# Patient Record
Sex: FEMALE | Race: WHITE | NOT HISPANIC OR LATINO | Employment: FULL TIME | URBAN - METROPOLITAN AREA
[De-identification: names, ages, dates, MRNs, and addresses within clinical notes are randomized per-mention and may not be internally consistent; named-entity substitution may affect disease eponyms.]

---

## 2023-05-09 ENCOUNTER — TELEPHONE (OUTPATIENT)
Dept: PSYCHIATRY | Facility: CLINIC | Age: 28
End: 2023-05-09

## 2023-05-09 NOTE — TELEPHONE ENCOUNTER
Dorothy Redman  requested a call back to discuss Talk therapy  Pt is looking to be schedule near [facility]        They can be reached at P# [phone]       Thank you

## 2023-05-10 NOTE — TELEPHONE ENCOUNTER
"Behavioral Health Outpatient Intake Questions    Referred By: self     Please advise interviewee that they need to answer all questions truthfully to allow for best care, and any misrepresentations of information may affect their ability to be seen at this clinic   => Was this discussed? Yes     If Minor Child (under age 25)    Who is/are the legal guardian(s) of the child? Is there a custody agreement? No     • If \"YES\"- Custody orders must be obtained prior to scheduling the first appointment  • In addition, Consent to Treatment must be signed by all legal guardians prior to scheduling the first appointment    • If \"NO\"- Consent to Treatment must be signed by all legal guardians prior to scheduling the first appointment    [address] History -     Presenting Problem (in patient's own words): delve into childhood trauma that she might have, relationship issues, etc    Are there any communication barriers for this patient? No                                               If yes, please describe barriers: n/a  • If there is a unique situation, please refer to [facility] for final determination  Are you taking any psychiatric medications? No   •   If \"YES\" -What are they n/a   •   If \"YES\" -Who prescribes? Has the Patient previously received outpatient Talk Therapy or Medication Management from [facility]  No     •    If \"YES\"- When, Where and with Whom? n/a     •    If \"NO\" -Has Patient received these services elsewhere? •   If \"YES\" -When, Where, and with Whom? E[facility] via [facility], [facility]    Has the Patient abused alcohol or other substances in the last 6 months ? No  No concerns of substance abuse are reported  •  If \"YES\" -What substance, How much, How often?n/a    •  If illegal substance: Refer to [facility] (for GERALDO) or [email or web address]    •  If Alcohol in excess of 10 drinks per week:  Refer to [facility] [facility] (for GERALDO) or [facility] " "Offices    Legal History-     Is this treatment court ordered? No   If \"yes \"send to :  • Talk Therapy : Send to [facility] for final determination   • Med Management: Send to Dr Ligia Meeks for final determination     Has the Patient been convicted of a felony? No   If \"Yes\" send to -When, What?n/a  • Talk Therapy : Send to [facility] for final determination   • Med Management: Send to Dr Ligia Meeks for final determination     ACCEPTED as a patient Yes  • If \"Yes\" Appointment Date: 5/23 at [facility]? No  • If “Yes” - (Where? Ex: [facility], [facility], [address] [place] [address], etc ) n/a      Name of [address] [place] [address]#[phone]  Insurance Phone #[phone]  If ins is primary or secondary?primary  If patient is a minor, parents information such as Name, D  O B of guarantor    "

## 2023-05-23 ENCOUNTER — SOCIAL WORK (OUTPATIENT)
Dept: BEHAVIORAL/MENTAL HEALTH CLINIC | Facility: CLINIC | Age: 28
End: 2023-05-23

## 2023-05-23 DIAGNOSIS — F41.9 ANXIETY DISORDER, UNSPECIFIED TYPE: Primary | ICD-10-CM

## 2023-05-23 DIAGNOSIS — F32.A DEPRESSION, UNSPECIFIED DEPRESSION TYPE: ICD-10-CM
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
## 2023-06-06 ENCOUNTER — SOCIAL WORK (OUTPATIENT)
Dept: BEHAVIORAL/MENTAL HEALTH CLINIC | Facility: CLINIC | Age: 28
End: 2023-06-06
Payer: COMMERCIAL

## 2023-06-06 DIAGNOSIS — F41.9 ANXIETY DISORDER, UNSPECIFIED TYPE: Primary | ICD-10-CM

## 2023-06-06 DIAGNOSIS — F32.A DEPRESSION, UNSPECIFIED DEPRESSION TYPE: ICD-10-CM

## 2023-06-06 PROCEDURE — 90834 PSYTX W PT 45 MINUTES: CPT | Performed by: COUNSELOR

## 2023-06-06 NOTE — PSYCH
Behavioral Health Psychotherapy Progress Note    Psychotherapy Provided: Individual Psychotherapy     1  Anxiety disorder, unspecified type        2  Depression, unspecified depression type              DATA: Client's mood was neutral and affect was congruent  Session occurred in the office  Client conveyed no changes since the intake  During this session, this clinician used the following therapeutic modalities: CBT    Substance Abuse was not addressed during this session  If the client is diagnosed with a co-occurring substance use disorder, please indicate any changes in the frequency or amount of use: N/A  Stage of change for addressing substance use diagnoses: No substance use/Not applicable      ASSESSMENT: Reviewed the results of the screeners with Arelis and inquired of her perspective of said results  Provided brief psychoed on the impact of adverse childhood events on adult health outcomes  Mutually completed an initial crisis plan and an initial treatment plan  Conversed on goals pertaining to emotions and to self-confidence  Ashley Hong revealed she wants to improve communication of her emotions with an outcome of having conversations with her boyfriend about her unpleasant emotions  Arelis shared she desires to explore her self-confidence in the work environment with an outcome of developing confidence in her ability to provide feedback to her supervisees  Clinician provided expectations for therapy and for the therapeutic relationship  Mckay Carcamo presents with a none risk of suicide, none risk of self-harm, and none risk of harm to others  A safety plan was indicated: no      PLAN: Client will focus on newly constructed treatment objectives  Behavioral Health Treatment Plan and Discharge Planning: Mckay Carcamo is aware of and agrees to continue to work on their treatment plan  They have identified and are working toward their discharge goals   yes    Visit start and stop times:    06/06/23  Start Time: 2079  Stop Time: 1805  Total Visit Time: 46 minutes

## 2023-06-06 NOTE — BH TREATMENT PLAN
"[address] [facility]  1995     Date of Initial Psychotherapy Assessment: 5-23-23    Date of Current Treatment Plan: 06/06/23  Treatment Plan Target Date: 10-6-23  Treatment Plan Expiration Date: 10-6-23    Diagnosis:   1  Anxiety disorder, unspecified type        2  Depression, unspecified depression type            Symptoms: Client endorsed anhedonia, sleep disturbance, low self-esteem, concentration deficit, restlessness, excessive worry, and racing thoughts  Intervention: CBT and IPT to address anxiety and depression    Long Term Goal: Client reported \"to have a better understanding of how those childhood experiences got me to where I am  Being able to understand my emotions and thoughts in the moment to be able to effectively communicate them  \"     Short Term Goal: Improve communication of her emotions      Outcome Goal: Client will report having conversations with her boyfriend about her unpleasant emotions     Short Term Goal: Explore self-confidence in the work environment        Outcome Goal: Client will report developing confidence in her ability to provide feedback to her supervisees    Strengths: mother, sister, self-awareness, reading     Discharge: Client shared \"when I'm able to manage my relationship and can have the difficult conversations with my boyfriend  \"    Importance (1-10): 8       I am currently under the care of a [facility] psychiatric provider: no    My [facility] psychiatric provider is: N/A    I am currently taking psychiatric medications: No    For children and adults who have a legal guardian:   Has there been any change to custody orders and/or guardianship status? NA  If yes, attach updated documentation      I have created my Crisis Plan and have been offered a copy of this plan    [address]: Diagnosis and Treatment Plan explained to [facility] acknowledges an " understanding of their diagnosis  Shayy Pass agrees to this treatment plan  I have been offered a copy of this Treatment Plan   no

## 2023-06-06 NOTE — BH CRISIS PLAN
"Client Name: Laurita Samuel       Client YOB: 1995  : 1995    Treatment Team (include name and contact information):     Psychotherapist: Connie Marte    Psychiatrist: N/A   Release of information completed: N/A      Healthcare Provider  MERRY Louis P   [address]  [phone]      Type of Plan   * Plans for all individuals 15 yo and above must be signed by the client  Plan Type: adolescent/adult (14 and over) Initial      My Personal Strengths are (in the client's own words):    Client reported \"self-awareness, organizied, and prepared  \"    The stressors and triggers that may put me at risk are:   Client conveyed \"my boyfriend, my family, I struggle with wanting alone time and wanting to talk to people, and when it comes time for my period  \"     Coping skills I can use to keep myself calm and safe:   Client shared \"reading, talking with my mom and sister, my boyfriend, and starting to go to the gym  \"    Coping skills/supports I can use to maintain abstinence from substance use:    Client shared \"reading, talking with my mom and sister, my boyfriend, and starting to go to the gym  \"    The people that provide me with help and support: (Include name, contact, and how they can help)      Support person #1: Helena Torie    * Phone number: In client's phone    * How can they help me? Client shared \"I go to her a lot for fertility struggles because she can related to what I'm going through  \"       Support person #2:Tessa-sister    * Phone number: In client's phone    * How can they help me? Client conveyed \"I feel super comfortable around her and I can talk to her about anything  We can talk about our childhood experiences  \"       In the past, the following has helped me in times of crisis:    Client expressed \"I call my mom or my sister  \"       If it is an emergency and you need immediate help, call     If there is a possibility of danger to yourself or " others, call the following crisis hotline resources:     Adult Crisis Numbers  Suicide Prevention Hotline - Dial 9-8-8  [facility]: [address]: [address]: [phone]  [address] ([facility]): [phone]  [facility]: [address] [place] [address] Avenue: [address] [place] [address] St: [phone] (daytime)  [phone] (after hours, weekends, holidays)     Child/Adolescent Crisis Numbers   [facility]: [address]: [phone]   [facility]: [phone]   [address] [facility] ([facility]): [phone]    Please note: Some [facility] do not have a separate number for Child/Adolescent specific crisis  If your county is not listed under Child/Adolescent, please call the adult number for your county     National Talk to Text Line   All Ages - [phone]    In the event your feelings become unmanageable, and you cannot reach your support system, you will call 911 immediately or go to the nearest hospital emergency room

## 2023-06-22 ENCOUNTER — SOCIAL WORK (OUTPATIENT)
Dept: BEHAVIORAL/MENTAL HEALTH CLINIC | Facility: CLINIC | Age: 28
End: 2023-06-22
Payer: COMMERCIAL

## 2023-06-22 ENCOUNTER — DOCUMENTATION (OUTPATIENT)
Dept: BEHAVIORAL/MENTAL HEALTH CLINIC | Facility: CLINIC | Age: 28
End: 2023-06-22

## 2023-06-22 DIAGNOSIS — F41.9 ANXIETY DISORDER, UNSPECIFIED TYPE: Primary | ICD-10-CM

## 2023-06-22 DIAGNOSIS — F32.A DEPRESSION, UNSPECIFIED DEPRESSION TYPE: ICD-10-CM

## 2023-06-22 PROCEDURE — 90834 PSYTX W PT 45 MINUTES: CPT | Performed by: COUNSELOR

## 2023-06-22 NOTE — PROGRESS NOTES
"Psychotherapy Discharge Summary    Preferred Name: Cash Drummond  YOB: 1995    Admission Date to Psychotherapy: 23    Referred by: Self    Presenting Problem: The following remarks were made by Adrian Carvajal at the time of intake: Client reported \"I didn't really like my last counselor\" and she is transferring services from another agency  Client conveyed \"I want to dive into childhood trauma\" and she has been having difficulty with memory from her childhood  Arelis expressed she believes trauma is \"bleeding into my current relationship\" with how they communicate and how she understands her partner  Marty Pruitt revealed she has challenges understanding what she is feeling at times and why she is feeling it  Client noted her romantic relationship can be a barometer for her mood on some days  Client shared her vocation is sometimes stressful because she takes on a lot of responsibility at times without requesting it  Arelis disclosed communication with her boyfriend is a struggle point  Client added her partner is a \"recovering addict\" whose father  due to alcohol use  Client noted her partner will \"dabble\" in cannabis use  Arelis expressed \"I have a lot of sexual frustrations\" in regards to quality and quantity of their sex  Arelis shared she experienced an emotionally painful miscarriage last year  Client noted family stress related to certain members  Piedadgamal Pruitt reported uncertainty with the future because she and her partner are not in unison and there is a Leni lack of follow through  \" Client conveyed she flounders to talk about what she is grateful for in general  Client explained she frequently compartmentalizes and her perspective on various items can flip flop  Client noted she initially began to struggle with her mental health detention through college in 2016  Marty Pruitt noted her current episode has lasted two years   Arelis identified coping as \"a lot of ignoring\", taking baths, crying, quality time with her " sister and niece, and quality time with her mother  Course of Treatment: Client attended 2 sessions of individual therapy post intake  Treatment focused on building rapport, assessment of symptomatolgy, and construction of a treatment plan  Treatment Complications (if any): N/A    Treatment Progress: Progress is indeterminate due to the brevity of care  Current [facility] Psychiatric Provider: N/A    Discharge Medications: N/A    Discharge Date: 6-22-23    Discharge Diagnosis:   1  Anxiety disorder, unspecified type        2  Depression, unspecified depression type            Criteria for Discharge: Beau Love shared she doesn't believe she has the emotional energy necessary to put her full effort into therapy  Client revealed she is more concentrated on her physical health through exercise and a health  that she believes it would be counterproductive to take on too much change simultaneously  Arelis added one of her major objectives for treatment was to address previous trauma and she is still deciphering if this would behoove her, so she wants to also ascertain an answer to this question prior to restarting therapy        Aftercare Recommendations: N/A    Prognosis: good

## 2023-06-22 NOTE — PSYCH
Behavioral Health Psychotherapy Progress Note    Psychotherapy Provided: Individual Psychotherapy     1  Anxiety disorder, unspecified type        2  Depression, unspecified depression type            DATA: Client's mood was euthymic and affect was congruent  Session occurred in the office  Client reported she would like to discontinue treatment  During this session, this clinician used the following therapeutic modalities: IPT and CBT    Substance Abuse was not addressed during this session  If the client is diagnosed with a co-occurring substance use disorder, please indicate any changes in the frequency or amount of use: N/A  Stage of change for addressing substance use diagnoses: No substance use/Not applicable      ASSESSMENT: Explored her rationale for ending therapy and Arelis shared she doesn't believe she has the emotional energy necessary to put her full effort into therapy  Client revealed she is more concentrated on her physical health through exercise and a health  that she believes it would be counterproductive to take on too much change simultaneously  Arelis added one of her major objectives for treatment was to address previous trauma and she is still deciphering if this would behoove her, so she wants to also ascertain an answer to this question prior to restarting therapy  Inquired if she foresees any drawbacks to ending treatment and client did not identify any  Clinician asked if he could provide Arelis with information on any mental health topic during today' session and client shared she would like introductory information on meditation  Clinician directed Arelis to the section How to Meditate on [email or web address] as well as recommended the apps Headspace, Insight Timer, and Calm  Praised Junito Quigley for her willingness to have a conversation with the clinician about her decision to conclude care       Leyla Thomas presents with a none risk of suicide, none risk of self-harm, and none risk of harm to others  A safety plan was indicated: no      PLAN:  Mutually agreed to end individual psychotherapy  Behavioral Health Treatment Plan and Discharge Planning: Susiolimpia Munson is aware of and agrees to continue to work on their treatment plan  They have identified and are working toward their discharge goals   yes    Visit start and stop times:    06/22/23  Start Time: 0404  Stop Time: 8825  Total Visit Time: 46 minutes